# Patient Record
Sex: MALE | Race: WHITE | NOT HISPANIC OR LATINO | URBAN - METROPOLITAN AREA
[De-identification: names, ages, dates, MRNs, and addresses within clinical notes are randomized per-mention and may not be internally consistent; named-entity substitution may affect disease eponyms.]

---

## 2019-03-13 ENCOUNTER — APPOINTMENT (OUTPATIENT)
Dept: URBAN - METROPOLITAN AREA CLINIC 217 | Age: 84
Setting detail: DERMATOLOGY
End: 2019-03-13

## 2019-03-13 DIAGNOSIS — L89 PRESSURE ULCER: ICD-10-CM

## 2019-03-13 PROBLEM — L89.891 PRESSURE ULCER OF OTHER SITE, STAGE 1: Status: ACTIVE | Noted: 2019-03-13

## 2019-03-13 PROBLEM — Z85.828 PERSONAL HISTORY OF OTHER MALIGNANT NEOPLASM OF SKIN: Status: ACTIVE | Noted: 2019-03-13

## 2019-03-13 PROCEDURE — OTHER COUNSELING: OTHER

## 2019-03-13 PROCEDURE — OTHER TREATMENT REGIMEN: OTHER

## 2019-03-13 PROCEDURE — OTHER MIPS QUALITY: OTHER

## 2019-03-13 PROCEDURE — OTHER PRESCRIPTION: OTHER

## 2019-03-13 PROCEDURE — 99202 OFFICE O/P NEW SF 15 MIN: CPT

## 2019-03-13 RX ORDER — OSTOMY SUPPLY 2 3/4"
EACH MISCELLANEOUS
Qty: 1 | Refills: 2 | Status: ERX | COMMUNITY
Start: 2019-03-13

## 2019-03-13 ASSESSMENT — SEVERITY ASSESSMENT: SEVERITY: MILD

## 2019-03-13 ASSESSMENT — AREA OF ULCER IN CM2: TOTAL AREA OF ULCER IN CM2: 2

## 2019-03-13 NOTE — PROCEDURE: TREATMENT REGIMEN
Initiate Treatment: Duoderm dressing - change every seven days Prn
Detail Level: Zone
Plan: Elevate pressure from sore to allow to heal by alternating weight.

## 2019-03-13 NOTE — PROCEDURE: MIPS QUALITY
Quality 130: Documentation Of Current Medications In The Medical Record: Current Medications Documented
Quality 226: Preventive Care And Screening: Tobacco Use: Screening And Cessation Intervention: Patient screened for tobacco use and is an ex/non-smoker
Quality 111:Pneumonia Vaccination Status For Older Adults: Pneumococcal Vaccination Previously Received
Quality 110: Preventive Care And Screening: Influenza Immunization: Influenza Immunization Administered during Influenza season
Quality 474: Zoster Vaccination Status: Shingrix Vaccination Administered or Previously Received
Detail Level: Detailed